# Patient Record
Sex: FEMALE | Race: WHITE | NOT HISPANIC OR LATINO | Employment: OTHER | URBAN - NONMETROPOLITAN AREA
[De-identification: names, ages, dates, MRNs, and addresses within clinical notes are randomized per-mention and may not be internally consistent; named-entity substitution may affect disease eponyms.]

---

## 2023-10-28 PROBLEM — I10 ESSENTIAL HYPERTENSION, BENIGN: Status: ACTIVE | Noted: 2023-10-28

## 2023-10-28 PROBLEM — I25.10 CORONARY ARTERY DISEASE: Status: ACTIVE | Noted: 2023-10-28

## 2023-10-28 PROBLEM — E78.5 HYPERLIPIDEMIA: Status: ACTIVE | Noted: 2023-10-28

## 2023-10-28 PROBLEM — K86.1 CHRONIC PANCREATITIS (MULTI): Status: ACTIVE | Noted: 2023-10-28

## 2023-10-28 PROBLEM — I42.8 NONISCHEMIC CARDIOMYOPATHY (MULTI): Status: ACTIVE | Noted: 2023-10-28

## 2023-10-28 RX ORDER — SPIRONOLACTONE 25 MG/1
1 TABLET ORAL DAILY
COMMUNITY

## 2023-10-28 RX ORDER — DICYCLOMINE HYDROCHLORIDE 10 MG/1
10 CAPSULE ORAL
COMMUNITY

## 2023-10-28 RX ORDER — LOSARTAN POTASSIUM 100 MG/1
1 TABLET ORAL DAILY
COMMUNITY

## 2023-10-28 RX ORDER — PANTOPRAZOLE SODIUM 40 MG/1
TABLET, DELAYED RELEASE ORAL
COMMUNITY

## 2023-10-28 RX ORDER — ATORVASTATIN CALCIUM 40 MG/1
1 TABLET, FILM COATED ORAL DAILY
COMMUNITY

## 2023-10-28 RX ORDER — ASPIRIN 81 MG/1
1 TABLET ORAL DAILY
COMMUNITY

## 2023-10-28 RX ORDER — CHOLECALCIFEROL (VITAMIN D3) 50 MCG
1 TABLET ORAL DAILY
COMMUNITY

## 2023-10-28 RX ORDER — AMLODIPINE BESYLATE 10 MG/1
1 TABLET ORAL DAILY
COMMUNITY
End: 2023-10-30

## 2023-10-28 RX ORDER — CHLORTHALIDONE 25 MG/1
0.5 TABLET ORAL DAILY
COMMUNITY
End: 2023-12-06

## 2023-10-30 ENCOUNTER — OFFICE VISIT (OUTPATIENT)
Dept: CARDIOLOGY | Facility: CLINIC | Age: 78
End: 2023-10-30
Payer: MEDICARE

## 2023-10-30 VITALS
WEIGHT: 108 LBS | HEART RATE: 60 BPM | SYSTOLIC BLOOD PRESSURE: 138 MMHG | DIASTOLIC BLOOD PRESSURE: 58 MMHG | HEIGHT: 56 IN | BODY MASS INDEX: 24.3 KG/M2

## 2023-10-30 DIAGNOSIS — I25.10 CORONARY ARTERY DISEASE INVOLVING NATIVE CORONARY ARTERY OF NATIVE HEART WITHOUT ANGINA PECTORIS: Primary | ICD-10-CM

## 2023-10-30 DIAGNOSIS — I10 ESSENTIAL HYPERTENSION, BENIGN: ICD-10-CM

## 2023-10-30 DIAGNOSIS — I42.8 NONISCHEMIC CARDIOMYOPATHY (MULTI): ICD-10-CM

## 2023-10-30 DIAGNOSIS — E78.2 MIXED HYPERLIPIDEMIA: ICD-10-CM

## 2023-10-30 LAB
NON-UH HIE ANION GAP: 11 (ref 6–15)
NON-UH HIE BASOPHILS # (AUTO): 0 10*3/UL (ref 0–0.2)
NON-UH HIE BASOPHILS % (AUTO): 0.4 %
NON-UH HIE BLOOD UREA NITROGEN: 23 MG/DL (ref 7–25)
NON-UH HIE CALCIUM: 10.1 MG/DL (ref 8.6–10.3)
NON-UH HIE CARBON DIOXIDE: 26.4 MMOL/L (ref 21–31)
NON-UH HIE CHLORIDE: 107 MMOL/L (ref 98–107)
NON-UH HIE CHOL/HDL RATIO: 3
NON-UH HIE CHOLESTEROL: 170 MG/DL (ref 140–200)
NON-UH HIE CREATININE: 1.34 MG/DL (ref 0.6–1.2)
NON-UH HIE EOSINOPHILS # (AUTO): 0.3 10*3/UL (ref 0–0.45)
NON-UH HIE EOSINOPHILS % (AUTO): 3.4 %
NON-UH HIE ESTIMATED GFR: 40.09
NON-UH HIE GLUCOSE: 91 MG/DL (ref 70–100)
NON-UH HIE HDL CHOLESTEROL: 56 MG/DL (ref 23–92)
NON-UH HIE HEMATOCRIT: 37.3 % (ref 34–46.4)
NON-UH HIE HEMOGLOBIN: 12.5 G/DL (ref 11.8–15.4)
NON-UH HIE LDL CHOLESTEROL,CALCULATED: 89 MG/DL (ref 0–100)
NON-UH HIE LYMPHOCYTES # (AUTO): 1.4 10*3/UL (ref 1–4.8)
NON-UH HIE LYMPHOCYTES % (AUTO): 16.6 %
NON-UH HIE MEAN CORPUSCULAR HEMOGLOBIN: 30.9 PG (ref 24.7–34.3)
NON-UH HIE MEAN CORPUSCULAR HGB CONC: 33.6 G/DL (ref 32–35)
NON-UH HIE MEAN CORPUSCULAR VOLUME: 91.8 FL (ref 80–100)
NON-UH HIE MEAN PLATELET VOLUME: 8.2 FL (ref 6.3–10.7)
NON-UH HIE MONOCYTES # (AUTO): 0.8 10*3/UL (ref 0–0.8)
NON-UH HIE MONOCYTES % (AUTO): 9 %
NON-UH HIE NEUTROPHILS # (AUTO): 6.1 10*3/UL (ref 1.8–7.7)
NON-UH HIE NEUTROPHILS % (AUTO): 70.6 %
NON-UH HIE NRBC%: 0 /100{WBC} (ref 0–0.5)
NON-UH HIE PLATELET COUNT: 296 10*3/UL (ref 150–450)
NON-UH HIE POTASSIUM: 4.4 MMOL/L (ref 3.5–5.1)
NON-UH HIE RED BLOOD COUNT: 4.07 (ref 3.6–5)
NON-UH HIE RED CELL DISTRIBUTION WIDTH: 13 % (ref 11.9–15.3)
NON-UH HIE SODIUM: 140 MMOL/L (ref 136–145)
NON-UH HIE TRIGLYCERIDE W/REFLEX: 127 MG/DL (ref 0–149)
NON-UH HIE UNCORRECTED WBC: 8.6 10*3/UL (ref 3.8–11.6)
NON-UH HIE VLDL CHOLESTEROL: 25 MG/DL
NON-UH HIE WHITE BLOOD COUNT: 8.6 10*3/UL (ref 3.8–11.6)

## 2023-10-30 PROCEDURE — 1036F TOBACCO NON-USER: CPT | Performed by: INTERNAL MEDICINE

## 2023-10-30 PROCEDURE — 3078F DIAST BP <80 MM HG: CPT | Performed by: INTERNAL MEDICINE

## 2023-10-30 PROCEDURE — 3075F SYST BP GE 130 - 139MM HG: CPT | Performed by: INTERNAL MEDICINE

## 2023-10-30 PROCEDURE — 1159F MED LIST DOCD IN RCRD: CPT | Performed by: INTERNAL MEDICINE

## 2023-10-30 PROCEDURE — 99214 OFFICE O/P EST MOD 30 MIN: CPT | Performed by: INTERNAL MEDICINE

## 2023-10-30 RX ORDER — AMLODIPINE BESYLATE 5 MG/1
5 TABLET ORAL DAILY
Qty: 30 TABLET | Refills: 11 | Status: SHIPPED | OUTPATIENT
Start: 2023-10-30 | End: 2024-10-29

## 2023-10-30 ASSESSMENT — ENCOUNTER SYMPTOMS: SHORTNESS OF BREATH: 1

## 2023-10-30 NOTE — PROGRESS NOTES
"Subjective   Bobbi Escoto is a 78 y.o. female       Chief Complaint    Follow-up          HPI     Patient returns in follow-up of problems as noted.  In the interim she is done very well.  She recently has developed subjective shortness of breath.  Significant discussion ensued.  She still cleaning the house, changing bed sheets, running the sweeper and working in the yard.  She states she does not have any shortness of breath or chest pain with these activities.  Though at times she feels \"short of breath\" is when she is talking or going up a flight of stairs.  Advised her that in these circumstances I would deem her shortness of breath to be situational and not on the basis of coronary disease heart disease or heart failure.  The fact that she can do all the aerobic activities without symptoms is very positive.  A flight of stairs makes most people short of breath and shortness of breath and conversation is more subjective than objective.  After discussing and explaining this in great detail she concurs    We discussed cardiac signs and symptoms of coronary disease to watch for and she denies such symptoms.  Likewise I cannot elicit any heart failure symptoms per se.  Review of Systems   Respiratory:  Positive for shortness of breath.    All other systems reviewed and are negative.         Visit Vitals  /58 (BP Location: Right arm, Patient Position: Sitting)   Pulse 60   Ht 1.422 m (4' 8\")   Wt 49 kg (108 lb)   BMI 24.21 kg/m²   Smoking Status Never   BSA 1.39 m²        Objective   Physical Exam  Constitutional:       Appearance: Normal appearance. She is normal weight.   HENT:      Nose: Nose normal.   Neck:      Vascular: No carotid bruit.   Cardiovascular:      Rate and Rhythm: Normal rate.      Pulses: Normal pulses.      Heart sounds: Normal heart sounds.   Pulmonary:      Effort: Pulmonary effort is normal.   Abdominal:      General: Bowel sounds are normal.      Palpations: Abdomen is soft. "   Genitourinary:     Rectum: Normal.   Musculoskeletal:         General: Normal range of motion.      Cervical back: Normal range of motion.      Right lower leg: No edema.      Left lower leg: No edema.   Skin:     General: Skin is warm and dry.   Neurological:      General: No focal deficit present.      Mental Status: She is alert.   Psychiatric:         Mood and Affect: Mood normal.         Behavior: Behavior normal.         Thought Content: Thought content normal.         Judgment: Judgment normal.         Current Medications    Current Outpatient Medications:     amLODIPine (Norvasc) 10 mg tablet, Take 1 tablet (10 mg) by mouth once daily., Disp: , Rfl:     aspirin 81 mg EC tablet, Take 1 tablet (81 mg) by mouth once daily., Disp: , Rfl:     atorvastatin (Lipitor) 40 mg tablet, Take 1 tablet (40 mg) by mouth once daily., Disp: , Rfl:     chlorthalidone (Hygroton) 25 mg tablet, Take 0.5 tablets (12.5 mg) by mouth once daily., Disp: , Rfl:     cholecalciferol (Vitamin D-3) 50 MCG (2000 UT) tablet, Take 1 tablet (2,000 Units) by mouth once daily., Disp: , Rfl:     dicyclomine (Bentyl) 10 mg capsule, Take 1 capsule (10 mg) by mouth. BEFORE EACH MEAL AND AT BEDTIME, Disp: , Rfl:     losartan (Cozaar) 100 mg tablet, Take 1 tablet (100 mg) by mouth once daily., Disp: , Rfl:     pantoprazole (ProtoNix) 40 mg EC tablet, Pantoprazole Sodium 40 MG Oral Tablet Delayed Release  Refills: 0     Active, Disp: , Rfl:     spironolactone (Aldactone) 25 mg tablet, Take 1 tablet (25 mg) by mouth once daily., Disp: , Rfl:                      Assessment/Plan   1. Coronary artery disease involving native coronary artery of native heart without angina pectoris  Stable, I doubt progression based upon her symptoms (and lack thereof).    2. Essential hypertension, benign  Well-controlled on current therapy    3. Mixed hyperlipidemia  Well-controlled on current therapy    4. Nonischemic cardiomyopathy (CMS/HCC)  No manifestations of  heart failure detectable today.

## 2023-12-04 DIAGNOSIS — I10 ESSENTIAL HYPERTENSION, BENIGN: ICD-10-CM

## 2023-12-06 RX ORDER — CHLORTHALIDONE 25 MG/1
12.5 TABLET ORAL DAILY
Qty: 45 TABLET | Refills: 3 | Status: SHIPPED | OUTPATIENT
Start: 2023-12-06

## 2023-12-07 ENCOUNTER — HOSPITAL ENCOUNTER
Dept: HOSPITAL 101 - VC | Age: 78
Discharge: HOME | End: 2023-12-07
Payer: MEDICARE

## 2023-12-07 DIAGNOSIS — I83.813: Primary | ICD-10-CM

## 2023-12-07 PROCEDURE — 93970 EXTREMITY STUDY: CPT

## 2023-12-07 PROCEDURE — G0463 HOSPITAL OUTPT CLINIC VISIT: HCPCS

## 2023-12-27 ENCOUNTER — TELEPHONE (OUTPATIENT)
Dept: CARDIOLOGY | Facility: CLINIC | Age: 78
End: 2023-12-27
Payer: MEDICARE

## 2023-12-27 NOTE — TELEPHONE ENCOUNTER
Patient called to report when she is walking she feels off balance ,dizzy and in a fog, states having headaches.  This started on 12/24/23  B/P running in the 150's over 50's. Heart rate noted in the high 50's. Thought she had the flu but thought it would pass by now if she was sick. She does have a call out to her PCP.  Please advise

## 2023-12-29 ENCOUNTER — HOSPITAL ENCOUNTER
Dept: HOSPITAL 101 - VC | Age: 78
Discharge: HOME | End: 2023-12-29
Payer: MEDICARE

## 2023-12-29 DIAGNOSIS — I83.813: Primary | ICD-10-CM

## 2023-12-29 PROCEDURE — 36478 ENDOVENOUS LASER 1ST VEIN: CPT

## 2023-12-29 RX ADMIN — SODIUM CHLORIDE 0 ML: 900 INJECTION, SOLUTION INTRAVENOUS at 09:05

## 2023-12-29 RX ADMIN — LIDOCAINE HYDROCHLORIDE 10 ML: 10 INJECTION, SOLUTION INFILTRATION; PERINEURAL at 09:04

## 2024-01-05 ENCOUNTER — HOSPITAL ENCOUNTER
Dept: HOSPITAL 101 - VC | Age: 79
Discharge: HOME | End: 2024-01-05
Payer: MEDICARE

## 2024-01-05 DIAGNOSIS — I80.02: Primary | ICD-10-CM

## 2024-01-05 PROCEDURE — 93971 EXTREMITY STUDY: CPT

## 2024-01-05 PROCEDURE — G0463 HOSPITAL OUTPT CLINIC VISIT: HCPCS

## 2024-01-09 ENCOUNTER — HOSPITAL ENCOUNTER
Dept: HOSPITAL 101 - VC | Age: 79
Discharge: HOME | End: 2024-01-09
Payer: MEDICARE

## 2024-01-09 DIAGNOSIS — I83.813: Primary | ICD-10-CM

## 2024-01-09 PROCEDURE — 36478 ENDOVENOUS LASER 1ST VEIN: CPT

## 2024-01-09 RX ADMIN — SODIUM CHLORIDE 0 ML: 900 INJECTION, SOLUTION INTRAVENOUS at 11:00

## 2024-01-09 RX ADMIN — LIDOCAINE HYDROCHLORIDE 0 ML: 10 INJECTION, SOLUTION INFILTRATION; PERINEURAL at 10:59

## 2024-01-12 ENCOUNTER — HOSPITAL ENCOUNTER
Dept: HOSPITAL 101 - VC | Age: 79
Discharge: HOME | End: 2024-01-12
Payer: MEDICARE

## 2024-01-12 DIAGNOSIS — I80.01: Primary | ICD-10-CM

## 2024-01-12 PROCEDURE — 93971 EXTREMITY STUDY: CPT

## 2024-01-12 PROCEDURE — G0463 HOSPITAL OUTPT CLINIC VISIT: HCPCS

## 2024-01-16 ENCOUNTER — HOSPITAL ENCOUNTER
Dept: HOSPITAL 101 - VC | Age: 79
Discharge: HOME | End: 2024-01-16
Payer: MEDICARE

## 2024-01-16 DIAGNOSIS — I83.813: Primary | ICD-10-CM

## 2024-01-16 PROCEDURE — 36466 NJX NONCMPND SCLRSNT MLT VN: CPT

## 2024-01-22 ENCOUNTER — HOSPITAL ENCOUNTER
Dept: HOSPITAL 101 - VC | Age: 79
Discharge: HOME | End: 2024-01-22
Payer: MEDICARE

## 2024-01-22 DIAGNOSIS — I80.02: Primary | ICD-10-CM

## 2024-01-22 PROCEDURE — 93971 EXTREMITY STUDY: CPT

## 2024-01-22 PROCEDURE — G0463 HOSPITAL OUTPT CLINIC VISIT: HCPCS

## 2024-01-23 ENCOUNTER — HOSPITAL ENCOUNTER
Dept: HOSPITAL 101 - VC | Age: 79
Discharge: HOME | End: 2024-01-23
Payer: MEDICARE

## 2024-01-23 DIAGNOSIS — I83.813: Primary | ICD-10-CM

## 2024-01-23 PROCEDURE — 36466 NJX NONCMPND SCLRSNT MLT VN: CPT

## 2024-01-31 ENCOUNTER — HOSPITAL ENCOUNTER
Dept: HOSPITAL 101 - VC | Age: 79
Discharge: HOME | End: 2024-01-31
Payer: MEDICARE

## 2024-01-31 DIAGNOSIS — I80.01: Primary | ICD-10-CM

## 2024-01-31 PROCEDURE — G0463 HOSPITAL OUTPT CLINIC VISIT: HCPCS

## 2024-01-31 PROCEDURE — 93971 EXTREMITY STUDY: CPT

## 2024-02-09 ENCOUNTER — HOSPITAL ENCOUNTER
Dept: HOSPITAL 101 - VC | Age: 79
Discharge: HOME | End: 2024-02-09
Payer: MEDICARE

## 2024-02-09 DIAGNOSIS — I83.813: Primary | ICD-10-CM

## 2024-02-09 PROCEDURE — 36471 NJX SCLRSNT MLT INCMPTNT VN: CPT

## 2024-02-13 ENCOUNTER — HOSPITAL ENCOUNTER
Dept: HOSPITAL 101 - VC | Age: 79
Discharge: HOME | End: 2024-02-13
Payer: MEDICARE

## 2024-02-13 DIAGNOSIS — I83.813: Primary | ICD-10-CM

## 2024-02-13 PROCEDURE — 36471 NJX SCLRSNT MLT INCMPTNT VN: CPT

## 2024-02-16 ENCOUNTER — HOSPITAL ENCOUNTER
Dept: HOSPITAL 101 - VC | Age: 79
Discharge: HOME | End: 2024-02-16
Payer: MEDICARE

## 2024-02-16 DIAGNOSIS — I83.813: Primary | ICD-10-CM

## 2024-02-16 PROCEDURE — 36471 NJX SCLRSNT MLT INCMPTNT VN: CPT

## 2024-04-01 ENCOUNTER — HOSPITAL ENCOUNTER
Dept: HOSPITAL 101 - VC | Age: 79
Discharge: HOME | End: 2024-04-01
Payer: MEDICARE

## 2024-04-01 DIAGNOSIS — I80.03: Primary | ICD-10-CM

## 2024-04-01 PROCEDURE — G0463 HOSPITAL OUTPT CLINIC VISIT: HCPCS

## 2024-04-01 PROCEDURE — 93970 EXTREMITY STUDY: CPT

## 2024-04-12 ENCOUNTER — HOSPITAL ENCOUNTER
Dept: HOSPITAL 101 - VC | Age: 79
Discharge: HOME | End: 2024-04-12
Payer: MEDICARE

## 2024-04-12 DIAGNOSIS — I83.813: Primary | ICD-10-CM

## 2024-04-12 PROCEDURE — 36471 NJX SCLRSNT MLT INCMPTNT VN: CPT

## 2024-04-18 ENCOUNTER — HOSPITAL ENCOUNTER
Dept: HOSPITAL 101 - VC | Age: 79
Discharge: HOME | End: 2024-04-18
Payer: MEDICARE

## 2024-04-18 DIAGNOSIS — I83.813: Primary | ICD-10-CM

## 2024-04-18 PROCEDURE — 36471 NJX SCLRSNT MLT INCMPTNT VN: CPT

## 2024-06-11 ENCOUNTER — APPOINTMENT (OUTPATIENT)
Dept: CARDIOLOGY | Facility: CLINIC | Age: 79
End: 2024-06-11
Payer: MEDICARE

## 2024-06-11 VITALS
HEIGHT: 58 IN | SYSTOLIC BLOOD PRESSURE: 146 MMHG | WEIGHT: 107.4 LBS | BODY MASS INDEX: 22.55 KG/M2 | DIASTOLIC BLOOD PRESSURE: 68 MMHG | HEART RATE: 58 BPM

## 2024-06-11 DIAGNOSIS — I10 ESSENTIAL HYPERTENSION, BENIGN: Primary | ICD-10-CM

## 2024-06-11 DIAGNOSIS — E78.2 MIXED HYPERLIPIDEMIA: ICD-10-CM

## 2024-06-11 DIAGNOSIS — Z78.9 NEVER SMOKED CIGARETTES: ICD-10-CM

## 2024-06-11 DIAGNOSIS — I42.8 NONISCHEMIC CARDIOMYOPATHY (MULTI): ICD-10-CM

## 2024-06-11 PROBLEM — I25.10 CORONARY ARTERY DISEASE: Status: RESOLVED | Noted: 2023-10-28 | Resolved: 2024-06-11

## 2024-06-11 PROCEDURE — 3078F DIAST BP <80 MM HG: CPT | Performed by: INTERNAL MEDICINE

## 2024-06-11 PROCEDURE — 3077F SYST BP >= 140 MM HG: CPT | Performed by: INTERNAL MEDICINE

## 2024-06-11 PROCEDURE — 1159F MED LIST DOCD IN RCRD: CPT | Performed by: INTERNAL MEDICINE

## 2024-06-11 PROCEDURE — 99214 OFFICE O/P EST MOD 30 MIN: CPT | Performed by: INTERNAL MEDICINE

## 2024-06-11 PROCEDURE — 1036F TOBACCO NON-USER: CPT | Performed by: INTERNAL MEDICINE

## 2024-06-11 RX ORDER — SERTRALINE HYDROCHLORIDE 25 MG/1
25 TABLET, FILM COATED ORAL DAILY
COMMUNITY

## 2024-06-11 NOTE — LETTER
"June 11, 2024     Gabriella Johnson,   [address]  [place] [address]    Patient: Bobbi Escoto   YOB: 1945   Date of Visit: 6/11/2024       Dear Dr. Gabriella Johnson, DO:    Thank you for referring Bobbi Escoto to me for evaluation. Below are my notes for this consultation.  If you have questions, please do not hesitate to call me. I look forward to following your patient along with you.       Sincerely,     Carmine Armstrong MD      CC: No Recipients  ______________________________________________________________________________________    Subjective   Bobbi Escoto is a 79 y.o. female       Chief Complaint    Follow-up          HPI     Review of Systems   All other systems reviewed and are negative.     Patient returns in follow-up of problems as noted.  She done well.  She denies angina CHF or arrhythmia symptomatology.  No orthopnea PND or other dyspnea and no edema.  Blood pressure and lipids appear adequately controlled.  Her previous evaluation of cardiomyopathy demonstrated normalization of ejection fraction on therapy because of this we continue to feel she is doing well stable and probably still has a normal ejection fraction.  Because of all the above we will continue therapy as before without change.      Vitals:    06/11/24 0929   BP: 146/68   BP Location: Left arm   Patient Position: Sitting   Pulse: 58   Weight: 48.7 kg (107 lb 6.4 oz)   Height: 1.473 m (4' 10\")        Objective   Physical Exam  Constitutional:       Appearance: Normal appearance.   HENT:      Nose: Nose normal.   Neck:      Vascular: No carotid bruit.   Cardiovascular:      Rate and Rhythm: Normal rate.      Pulses: Normal pulses.      Heart sounds: Normal heart sounds.   Pulmonary:      Effort: Pulmonary effort is normal.   Abdominal:      General: Bowel sounds are normal.      Palpations: Abdomen is soft.   Musculoskeletal:         General: Normal range of motion.      " Cervical back: Normal range of motion.      Right lower leg: No edema.      Left lower leg: No edema.   Skin:     General: Skin is warm and dry.   Neurological:      General: No focal deficit present.      Mental Status: She is alert.   Psychiatric:         Mood and Affect: Mood normal.         Behavior: Behavior normal.         Thought Content: Thought content normal.         Judgment: Judgment normal.         Allergies  Patient has no known allergies.     Current Medications    Current Outpatient Medications:   •  amLODIPine (Norvasc) 5 mg tablet, Take 1 tablet (5 mg) by mouth once daily., Disp: 30 tablet, Rfl: 11  •  aspirin 81 mg EC tablet, Take 1 tablet (81 mg) by mouth once daily., Disp: , Rfl:   •  atorvastatin (Lipitor) 40 mg tablet, Take 1 tablet (40 mg) by mouth once daily., Disp: , Rfl:   •  chlorthalidone (Hygroton) 25 mg tablet, TAKE 1/2 TABLET EVERY DAY, Disp: 45 tablet, Rfl: 3  •  cholecalciferol (Vitamin D-3) 50 MCG (2000 UT) tablet, Take 1 tablet (2,000 Units) by mouth once daily., Disp: , Rfl:   •  dicyclomine (Bentyl) 10 mg capsule, Take 1 capsule (10 mg) by mouth. BEFORE EACH MEAL AND AT BEDTIME, Disp: , Rfl:   •  losartan (Cozaar) 100 mg tablet, Take 1 tablet (100 mg) by mouth once daily., Disp: , Rfl:   •  pantoprazole (ProtoNix) 40 mg EC tablet, Pantoprazole Sodium 40 MG Oral Tablet Delayed Release  Refills: 0     Active, Disp: , Rfl:   •  sertraline (Zoloft) 25 mg tablet, Take 1 tablet (25 mg) by mouth once daily., Disp: , Rfl:   •  spironolactone (Aldactone) 25 mg tablet, Take 1 tablet (25 mg) by mouth once daily., Disp: , Rfl:                      Assessment/Plan   1. Essential hypertension, benign  Review of treatment strategy demonstrates good control  - Follow Up In Cardiology    2. Mixed hyperlipidemia  Review of treatment strategy demonstrates good control    3. Nonischemic cardiomyopathy (Multi)  Normalized ejection fraction 55 to 60% on guideline directed therapy    4. Never smoked  cigarettes  Congratulated on lifestyle choices          Scribe Attestation  By signing my name below, I, Aubrey Hauser LPNibcele   attest that this documentation has been prepared under the direction and in the presence of Carmine Armstrong MD.     Provider Attestation - Scribe documentation    All medical record entries made by the Scribe were at my direction and personally dictated by me. I have reviewed the chart and agree that the record accurately reflects my personal performance of the history, physical exam, discussion and plan.

## 2024-06-11 NOTE — PROGRESS NOTES
"Subjective   Bobbi Escoto is a 79 y.o. female       Chief Complaint    Follow-up          HPI     Review of Systems   All other systems reviewed and are negative.     Patient returns in follow-up of problems as noted.  She done well.  She denies angina CHF or arrhythmia symptomatology.  No orthopnea PND or other dyspnea and no edema.  Blood pressure and lipids appear adequately controlled.  Her previous evaluation of cardiomyopathy demonstrated normalization of ejection fraction on therapy because of this we continue to feel she is doing well stable and probably still has a normal ejection fraction.  Because of all the above we will continue therapy as before without change.      Vitals:    06/11/24 0929   BP: 146/68   BP Location: Left arm   Patient Position: Sitting   Pulse: 58   Weight: 48.7 kg (107 lb 6.4 oz)   Height: 1.473 m (4' 10\")        Objective   Physical Exam  Constitutional:       Appearance: Normal appearance.   HENT:      Nose: Nose normal.   Neck:      Vascular: No carotid bruit.   Cardiovascular:      Rate and Rhythm: Normal rate.      Pulses: Normal pulses.      Heart sounds: Normal heart sounds.   Pulmonary:      Effort: Pulmonary effort is normal.   Abdominal:      General: Bowel sounds are normal.      Palpations: Abdomen is soft.   Musculoskeletal:         General: Normal range of motion.      Cervical back: Normal range of motion.      Right lower leg: No edema.      Left lower leg: No edema.   Skin:     General: Skin is warm and dry.   Neurological:      General: No focal deficit present.      Mental Status: She is alert.   Psychiatric:         Mood and Affect: Mood normal.         Behavior: Behavior normal.         Thought Content: Thought content normal.         Judgment: Judgment normal.         Allergies  Patient has no known allergies.     Current Medications    Current Outpatient Medications:     amLODIPine (Norvasc) 5 mg tablet, Take 1 tablet (5 mg) by mouth once daily., Disp: 30 " tablet, Rfl: 11    aspirin 81 mg EC tablet, Take 1 tablet (81 mg) by mouth once daily., Disp: , Rfl:     atorvastatin (Lipitor) 40 mg tablet, Take 1 tablet (40 mg) by mouth once daily., Disp: , Rfl:     chlorthalidone (Hygroton) 25 mg tablet, TAKE 1/2 TABLET EVERY DAY, Disp: 45 tablet, Rfl: 3    cholecalciferol (Vitamin D-3) 50 MCG (2000 UT) tablet, Take 1 tablet (2,000 Units) by mouth once daily., Disp: , Rfl:     dicyclomine (Bentyl) 10 mg capsule, Take 1 capsule (10 mg) by mouth. BEFORE EACH MEAL AND AT BEDTIME, Disp: , Rfl:     losartan (Cozaar) 100 mg tablet, Take 1 tablet (100 mg) by mouth once daily., Disp: , Rfl:     pantoprazole (ProtoNix) 40 mg EC tablet, Pantoprazole Sodium 40 MG Oral Tablet Delayed Release  Refills: 0     Active, Disp: , Rfl:     sertraline (Zoloft) 25 mg tablet, Take 1 tablet (25 mg) by mouth once daily., Disp: , Rfl:     spironolactone (Aldactone) 25 mg tablet, Take 1 tablet (25 mg) by mouth once daily., Disp: , Rfl:                      Assessment/Plan   1. Essential hypertension, benign  Review of treatment strategy demonstrates good control  - Follow Up In Cardiology    2. Mixed hyperlipidemia  Review of treatment strategy demonstrates good control    3. Nonischemic cardiomyopathy (Multi)  Normalized ejection fraction 55 to 60% on guideline directed therapy    4. Never smoked cigarettes  Congratulated on lifestyle choices          Scribe Attestation  By signing my name below, IKarla LPN, Scribe   attest that this documentation has been prepared under the direction and in the presence of Carmine Armstrong MD.     Provider Attestation - Scribe documentation    All medical record entries made by the Scribe were at my direction and personally dictated by me. I have reviewed the chart and agree that the record accurately reflects my personal performance of the history, physical exam, discussion and plan.

## 2024-08-17 DIAGNOSIS — I10 ESSENTIAL HYPERTENSION, BENIGN: ICD-10-CM

## 2024-08-17 DIAGNOSIS — I42.8 NONISCHEMIC CARDIOMYOPATHY (MULTI): ICD-10-CM

## 2024-08-20 RX ORDER — AMLODIPINE BESYLATE 5 MG/1
5 TABLET ORAL DAILY
Qty: 90 TABLET | Refills: 3 | Status: SHIPPED | OUTPATIENT
Start: 2024-08-20 | End: 2025-08-20

## 2024-09-25 DIAGNOSIS — I10 ESSENTIAL HYPERTENSION, BENIGN: ICD-10-CM

## 2024-09-25 RX ORDER — CHLORTHALIDONE 25 MG/1
12.5 TABLET ORAL DAILY
Qty: 45 TABLET | Refills: 3 | Status: SHIPPED | OUTPATIENT
Start: 2024-09-25

## 2025-01-28 ENCOUNTER — APPOINTMENT (OUTPATIENT)
Dept: CARDIOLOGY | Facility: CLINIC | Age: 80
End: 2025-01-28
Payer: MEDICARE

## 2025-01-28 VITALS
HEART RATE: 62 BPM | DIASTOLIC BLOOD PRESSURE: 66 MMHG | WEIGHT: 114.2 LBS | BODY MASS INDEX: 23.97 KG/M2 | HEIGHT: 58 IN | SYSTOLIC BLOOD PRESSURE: 136 MMHG

## 2025-01-28 DIAGNOSIS — I10 ESSENTIAL HYPERTENSION, BENIGN: Primary | ICD-10-CM

## 2025-01-28 DIAGNOSIS — Z86.79 HISTORY OF CARDIOMYOPATHY: ICD-10-CM

## 2025-01-28 DIAGNOSIS — E78.5 HYPERLIPIDEMIA, UNSPECIFIED HYPERLIPIDEMIA TYPE: ICD-10-CM

## 2025-01-28 DIAGNOSIS — Z78.9 NEVER SMOKED CIGARETTES: ICD-10-CM

## 2025-01-28 PROCEDURE — 99214 OFFICE O/P EST MOD 30 MIN: CPT | Performed by: INTERNAL MEDICINE

## 2025-01-28 PROCEDURE — 1036F TOBACCO NON-USER: CPT | Performed by: INTERNAL MEDICINE

## 2025-01-28 PROCEDURE — 3075F SYST BP GE 130 - 139MM HG: CPT | Performed by: INTERNAL MEDICINE

## 2025-01-28 PROCEDURE — 1159F MED LIST DOCD IN RCRD: CPT | Performed by: INTERNAL MEDICINE

## 2025-01-28 PROCEDURE — 3078F DIAST BP <80 MM HG: CPT | Performed by: INTERNAL MEDICINE

## 2025-01-28 NOTE — LETTER
January 28, 2025     Gabriella Johnson DO  [address]  [place] [address]    Patient: Bobbi Escoto   YOB: 1945   Date of Visit: 1/28/2025       Dear Dr. Gabriella Johnson DO:    Thank you for referring Bobbi Escoto to me for evaluation. Below are my notes for this consultation.  If you have questions, please do not hesitate to call me. I look forward to following your patient along with you.       Sincerely,     Laura Shahid MD      CC: No Recipients  ______________________________________________________________________________________    Subjective   Bobbi Escoto is a 79 y.o. female       Chief Complaint    Follow-up          HPI   79-year-old white female has previously followed with Dr. Armstrong.  Apparently at 1 time she was in [place] and was told she had nonischemic cardiomyopathy I do not have any of the records in our medical records.  She has been treated and apparently has improved.  Echocardiogram 2017 was normal nuclear stress test in 2022 was normal.  She has hypertension on medical therapy which has been under control.  She maintains very active lifestyle and denies any palpitations orthopnea PND lower extremity edema.  Her lab data from December 2024 were reviewed and shared with her and there was no concern noted.    Assessment/recommendations:    1-remote history of nonischemic cardiomyopathy.  No record of heart failure unavailable in our medical records.  The only echo I found was in 2017 which was normal.  Her last nuclear stress test 2022 was normal as well.  Presently she is on medical therapy well-tolerated and without any side effect will be continued.  2-essential hypertension, currently under control on multiple medication which have been well-tolerated  3-hyperlipidemia on statin therapy under control, no changes are needed.  Annual follow-up will be scheduled  Review of Systems   All other systems reviewed and are negative.    "        Vitals:    01/28/25 0926   BP: 136/66   BP Location: Left arm   Patient Position: Sitting   Pulse: 62   Weight: 51.8 kg (114 lb 3.2 oz)   Height: 1.473 m (4' 10\")        Objective   Physical Exam  Constitutional:       Appearance: Normal appearance.   HENT:      Nose: Nose normal.   Neck:      Vascular: No carotid bruit.   Cardiovascular:      Rate and Rhythm: Normal rate.      Pulses: Normal pulses.      Heart sounds: Normal heart sounds.   Pulmonary:      Effort: Pulmonary effort is normal.   Abdominal:      General: Bowel sounds are normal.      Palpations: Abdomen is soft.   Musculoskeletal:         General: Normal range of motion.      Cervical back: Normal range of motion.      Right lower leg: No edema.      Left lower leg: No edema.   Skin:     General: Skin is warm and dry.   Neurological:      General: No focal deficit present.      Mental Status: She is alert.   Psychiatric:         Mood and Affect: Mood normal.         Behavior: Behavior normal.         Thought Content: Thought content normal.         Judgment: Judgment normal.         Allergies  Patient has no known allergies.     Current Medications    Current Outpatient Medications:   •  amLODIPine (Norvasc) 5 mg tablet, Take 1 tablet (5 mg) by mouth once daily., Disp: 90 tablet, Rfl: 3  •  aspirin 81 mg EC tablet, Take 1 tablet (81 mg) by mouth once daily., Disp: , Rfl:   •  atorvastatin (Lipitor) 40 mg tablet, Take 1 tablet (40 mg) by mouth once daily., Disp: , Rfl:   •  chlorthalidone (Hygroton) 25 mg tablet, TAKE 1/2 TABLET EVERY DAY, Disp: 45 tablet, Rfl: 3  •  cholecalciferol (Vitamin D-3) 50 MCG (2000 UT) tablet, Take 1 tablet (2,000 Units) by mouth once daily., Disp: , Rfl:   •  dicyclomine (Bentyl) 10 mg capsule, Take 1 capsule (10 mg) by mouth if needed. BEFORE EACH MEAL AND AT BEDTIME, Disp: , Rfl:   •  losartan (Cozaar) 100 mg tablet, Take 1 tablet (100 mg) by mouth once daily., Disp: , Rfl:   •  pantoprazole (ProtoNix) 40 mg EC " tablet, Take 1 tablet (40 mg) by mouth once daily in the morning. Take before meals., Disp: , Rfl:   •  sertraline (Zoloft) 25 mg tablet, Take 1 tablet (25 mg) by mouth once daily at bedtime., Disp: , Rfl:   •  spironolactone (Aldactone) 25 mg tablet, Take 1 tablet (25 mg) by mouth once daily., Disp: , Rfl:                      Assessment/Plan   1. Essential hypertension, benign  Follow Up In Cardiology      2. Hyperlipidemia, unspecified hyperlipidemia type        3. BMI 23.0-23.9, adult        4. Never smoked cigarettes                 Scribe Attestation  By signing my name below, INorma LPN   , Anju   attest that this documentation has been prepared under the direction and in the presence of Laura Shahid MD.     Provider Attestation - Scribe documentation    All medical record entries made by the Scribe were at my direction and personally dictated by me. I have reviewed the chart and agree that the record accurately reflects my personal performance of the history, physical exam, discussion and plan.

## 2025-01-28 NOTE — PATIENT INSTRUCTIONS
Please bring all medicines, vitamins, and herbal supplements with you when you come to the office.    Prescriptions will not be filled unless you are compliant with your follow up appointments or have a follow up appointment scheduled as per instruction of your physician. Refills should be requested at the time of your visit.     One year

## 2025-01-28 NOTE — PROGRESS NOTES
"Subjective   Bobbi Escoto is a 79 y.o. female       Chief Complaint    Follow-up          HPI   79-year-old white female has previously followed with Dr. Armstrong.  Apparently at 1 time she was in [place] and was told she had nonischemic cardiomyopathy I do not have any of the records in our medical records.  She has been treated and apparently has improved.  Echocardiogram 2017 was normal nuclear stress test in 2022 was normal.  She has hypertension on medical therapy which has been under control.  She maintains very active lifestyle and denies any palpitations orthopnea PND lower extremity edema.  Her lab data from December 2024 were reviewed and shared with her and there was no concern noted.    Assessment/recommendations:    1-remote history of nonischemic cardiomyopathy.  No record of heart failure unavailable in our medical records.  The only echo I found was in 2017 which was normal.  Her last nuclear stress test 2022 was normal as well.  Presently she is on medical therapy well-tolerated and without any side effect will be continued.  2-essential hypertension, currently under control on multiple medication which have been well-tolerated  3-hyperlipidemia on statin therapy under control, no changes are needed.  Annual follow-up will be scheduled  Review of Systems   All other systems reviewed and are negative.           Vitals:    01/28/25 0926   BP: 136/66   BP Location: Left arm   Patient Position: Sitting   Pulse: 62   Weight: 51.8 kg (114 lb 3.2 oz)   Height: 1.473 m (4' 10\")        Objective   Physical Exam  Constitutional:       Appearance: Normal appearance.   HENT:      Nose: Nose normal.   Neck:      Vascular: No carotid bruit.   Cardiovascular:      Rate and Rhythm: Normal rate.      Pulses: Normal pulses.      Heart sounds: Normal heart sounds.   Pulmonary:      Effort: Pulmonary effort is normal.   Abdominal:      General: Bowel sounds are normal.      Palpations: Abdomen is soft. "   Musculoskeletal:         General: Normal range of motion.      Cervical back: Normal range of motion.      Right lower leg: No edema.      Left lower leg: No edema.   Skin:     General: Skin is warm and dry.   Neurological:      General: No focal deficit present.      Mental Status: She is alert.   Psychiatric:         Mood and Affect: Mood normal.         Behavior: Behavior normal.         Thought Content: Thought content normal.         Judgment: Judgment normal.         Allergies  Patient has no known allergies.     Current Medications    Current Outpatient Medications:     amLODIPine (Norvasc) 5 mg tablet, Take 1 tablet (5 mg) by mouth once daily., Disp: 90 tablet, Rfl: 3    aspirin 81 mg EC tablet, Take 1 tablet (81 mg) by mouth once daily., Disp: , Rfl:     atorvastatin (Lipitor) 40 mg tablet, Take 1 tablet (40 mg) by mouth once daily., Disp: , Rfl:     chlorthalidone (Hygroton) 25 mg tablet, TAKE 1/2 TABLET EVERY DAY, Disp: 45 tablet, Rfl: 3    cholecalciferol (Vitamin D-3) 50 MCG (2000 UT) tablet, Take 1 tablet (2,000 Units) by mouth once daily., Disp: , Rfl:     dicyclomine (Bentyl) 10 mg capsule, Take 1 capsule (10 mg) by mouth if needed. BEFORE EACH MEAL AND AT BEDTIME, Disp: , Rfl:     losartan (Cozaar) 100 mg tablet, Take 1 tablet (100 mg) by mouth once daily., Disp: , Rfl:     pantoprazole (ProtoNix) 40 mg EC tablet, Take 1 tablet (40 mg) by mouth once daily in the morning. Take before meals., Disp: , Rfl:     sertraline (Zoloft) 25 mg tablet, Take 1 tablet (25 mg) by mouth once daily at bedtime., Disp: , Rfl:     spironolactone (Aldactone) 25 mg tablet, Take 1 tablet (25 mg) by mouth once daily., Disp: , Rfl:                      Assessment/Plan   1. Essential hypertension, benign  Follow Up In Cardiology      2. Hyperlipidemia, unspecified hyperlipidemia type        3. BMI 23.0-23.9, adult        4. Never smoked cigarettes                 Scribe Attestation  By signing my name below, Norma TROTTER,  LPN   , Scribe   attest that this documentation has been prepared under the direction and in the presence of Laura Shahid MD.     Provider Attestation - Scribe documentation    All medical record entries made by the Scribe were at my direction and personally dictated by me. I have reviewed the chart and agree that the record accurately reflects my personal performance of the history, physical exam, discussion and plan.

## 2025-04-23 ENCOUNTER — TELEPHONE (OUTPATIENT)
Dept: CARDIOLOGY | Facility: CLINIC | Age: 80
End: 2025-04-23
Payer: MEDICARE

## 2025-04-23 NOTE — TELEPHONE ENCOUNTER
Daughter, Comfort, phones stating patient is going to be having a left knee replacement with Dr Brooks date tbd. She is thinking the first week of May. Daughter inquiring about cardiac clearance and lettr stating she is cleared. Reports they are friend with Dr Ellis, anesthesiologist, and he told them to contact her cardiologist for clearance    Phoned Dr Brooks office, they state that patient is just in the beginning stage of preparing for surgery. They said they do not require cardiac clearance however if her family doctor does then they would let us know. They state at this time they are not requesting cardiac clearance.

## 2025-06-08 DIAGNOSIS — I42.8 NONISCHEMIC CARDIOMYOPATHY (MULTI): ICD-10-CM

## 2025-06-08 DIAGNOSIS — I10 ESSENTIAL HYPERTENSION, BENIGN: ICD-10-CM

## 2025-06-09 RX ORDER — AMLODIPINE BESYLATE 5 MG/1
5 TABLET ORAL DAILY
Qty: 90 TABLET | Refills: 3 | Status: SHIPPED | OUTPATIENT
Start: 2025-06-09

## 2025-07-16 DIAGNOSIS — I10 ESSENTIAL HYPERTENSION, BENIGN: ICD-10-CM

## 2025-07-16 RX ORDER — CHLORTHALIDONE 25 MG/1
12.5 TABLET ORAL DAILY
Qty: 45 TABLET | Refills: 3 | Status: SHIPPED | OUTPATIENT
Start: 2025-07-16

## 2026-01-28 ENCOUNTER — APPOINTMENT (OUTPATIENT)
Dept: CARDIOLOGY | Facility: CLINIC | Age: 81
End: 2026-01-28
Payer: MEDICARE